# Patient Record
Sex: MALE | Race: WHITE | NOT HISPANIC OR LATINO | Employment: OTHER | URBAN - METROPOLITAN AREA
[De-identification: names, ages, dates, MRNs, and addresses within clinical notes are randomized per-mention and may not be internally consistent; named-entity substitution may affect disease eponyms.]

---

## 2022-05-30 ENCOUNTER — TELEPHONE (OUTPATIENT)
Dept: GASTROENTEROLOGY | Facility: CLINIC | Age: 62
End: 2022-05-30
Payer: COMMERCIAL

## 2022-05-30 NOTE — TELEPHONE ENCOUNTER
----- Message from Ernestina Berrios sent at 5/30/2022 10:39 AM CDT -----  Contact: [phone]  Type:  Sooner Appointment Request    Caller is requesting a sooner appointment.  Caller declined first available appointment listed below.  Caller will not accept being placed on the waitlist and is requesting a message be sent to doctor.    Name of Caller:  Pt  When is the first available appointment?  6/29  Symptoms:  Abdominal Cramping/ Cramping in colon        Additional Information:  [phone]

## 2022-05-30 NOTE — TELEPHONE ENCOUNTER
Call placed to pt and appt scheduled for next available, that worked for pt.  No further questions or concerns at this time.

## 2022-06-15 ENCOUNTER — OFFICE VISIT (OUTPATIENT)
Dept: GASTROENTEROLOGY | Facility: CLINIC | Age: 62
End: 2022-06-15
Payer: COMMERCIAL

## 2022-06-15 VITALS
HEIGHT: 72 IN | SYSTOLIC BLOOD PRESSURE: 125 MMHG | TEMPERATURE: 98 F | DIASTOLIC BLOOD PRESSURE: 73 MMHG | WEIGHT: 212.63 LBS | BODY MASS INDEX: 28.8 KG/M2 | OXYGEN SATURATION: 100 % | RESPIRATION RATE: 19 BRPM | HEART RATE: 86 BPM

## 2022-06-15 DIAGNOSIS — K21.9 GASTROESOPHAGEAL REFLUX DISEASE, UNSPECIFIED WHETHER ESOPHAGITIS PRESENT: ICD-10-CM

## 2022-06-15 DIAGNOSIS — K43.9 ABDOMINAL WALL HERNIA: ICD-10-CM

## 2022-06-15 DIAGNOSIS — K57.90 DIVERTICULOSIS: ICD-10-CM

## 2022-06-15 DIAGNOSIS — E65 OBESE ABDOMEN: ICD-10-CM

## 2022-06-15 DIAGNOSIS — Z87.19 HISTORY OF DIVERTICULITIS: Primary | ICD-10-CM

## 2022-06-15 DIAGNOSIS — K80.20 CALCULUS OF GALLBLADDER WITHOUT CHOLECYSTITIS WITHOUT OBSTRUCTION: ICD-10-CM

## 2022-06-15 PROCEDURE — 3074F PR MOST RECENT SYSTOLIC BLOOD PRESSURE < 130 MM HG: ICD-10-PCS | Mod: S$GLB,,, | Performed by: INTERNAL MEDICINE

## 2022-06-15 PROCEDURE — 99999 PR PBB SHADOW E&M-EST. PATIENT-LVL IV: CPT | Mod: PBBFAC,,, | Performed by: INTERNAL MEDICINE

## 2022-06-15 PROCEDURE — 3008F PR BODY MASS INDEX (BMI) DOCUMENTED: ICD-10-PCS | Mod: S$GLB,,, | Performed by: INTERNAL MEDICINE

## 2022-06-15 PROCEDURE — 3074F SYST BP LT 130 MM HG: CPT | Mod: S$GLB,,, | Performed by: INTERNAL MEDICINE

## 2022-06-15 PROCEDURE — 99204 OFFICE O/P NEW MOD 45 MIN: CPT | Mod: S$GLB,,, | Performed by: INTERNAL MEDICINE

## 2022-06-15 PROCEDURE — 1159F MED LIST DOCD IN RCRD: CPT | Mod: S$GLB,,, | Performed by: INTERNAL MEDICINE

## 2022-06-15 PROCEDURE — 1160F PR REVIEW ALL MEDS BY PRESCRIBER/CLIN PHARMACIST DOCUMENTED: ICD-10-PCS | Mod: S$GLB,,, | Performed by: INTERNAL MEDICINE

## 2022-06-15 PROCEDURE — 99204 PR OFFICE/OUTPT VISIT, NEW, LEVL IV, 45-59 MIN: ICD-10-PCS | Mod: S$GLB,,, | Performed by: INTERNAL MEDICINE

## 2022-06-15 PROCEDURE — 3078F DIAST BP <80 MM HG: CPT | Mod: S$GLB,,, | Performed by: INTERNAL MEDICINE

## 2022-06-15 PROCEDURE — 99999 PR PBB SHADOW E&M-EST. PATIENT-LVL IV: ICD-10-PCS | Mod: PBBFAC,,, | Performed by: INTERNAL MEDICINE

## 2022-06-15 PROCEDURE — 1160F RVW MEDS BY RX/DR IN RCRD: CPT | Mod: S$GLB,,, | Performed by: INTERNAL MEDICINE

## 2022-06-15 PROCEDURE — 3078F PR MOST RECENT DIASTOLIC BLOOD PRESSURE < 80 MM HG: ICD-10-PCS | Mod: S$GLB,,, | Performed by: INTERNAL MEDICINE

## 2022-06-15 PROCEDURE — 3008F BODY MASS INDEX DOCD: CPT | Mod: S$GLB,,, | Performed by: INTERNAL MEDICINE

## 2022-06-15 PROCEDURE — 1159F PR MEDICATION LIST DOCUMENTED IN MEDICAL RECORD: ICD-10-PCS | Mod: S$GLB,,, | Performed by: INTERNAL MEDICINE

## 2022-06-15 RX ORDER — KETOCONAZOLE 20 MG/G
AEROSOL, FOAM TOPICAL
COMMUNITY
Start: 2022-06-06

## 2022-06-15 RX ORDER — INSULIN GLARGINE 300 U/ML
INJECTION, SOLUTION SUBCUTANEOUS
COMMUNITY
Start: 2021-10-07

## 2022-06-15 RX ORDER — ZINC GLUCONATE 50 MG
TABLET ORAL
COMMUNITY
Start: 2022-01-04

## 2022-06-15 RX ORDER — FLUCONAZOLE 200 MG/1
200 TABLET ORAL
COMMUNITY
Start: 2022-06-14

## 2022-06-15 RX ORDER — PIOGLITAZONEHYDROCHLORIDE 15 MG/1
TABLET ORAL
COMMUNITY
Start: 2021-12-09

## 2022-06-15 RX ORDER — SILDENAFIL 100 MG/1
100 TABLET, FILM COATED ORAL
COMMUNITY
Start: 2022-06-08

## 2022-06-15 RX ORDER — ATORVASTATIN CALCIUM 10 MG/1
TABLET, FILM COATED ORAL
COMMUNITY
Start: 2022-03-07

## 2022-06-15 RX ORDER — DAPAGLIFLOZIN AND METFORMIN HYDROCHLORIDE 5; 1000 MG/1; MG/1
TABLET, FILM COATED, EXTENDED RELEASE ORAL
COMMUNITY
Start: 2021-12-17

## 2022-06-15 RX ORDER — SEMAGLUTIDE 1.34 MG/ML
INJECTION, SOLUTION SUBCUTANEOUS
COMMUNITY
Start: 2021-12-22

## 2022-06-15 RX ORDER — PIMECROLIMUS 10 MG/G
CREAM TOPICAL
COMMUNITY

## 2022-06-15 NOTE — PROGRESS NOTES
Subjective:       Patient ID: Frank Victoria is a 61 y.o. male.    Chief Complaint: Abdominal Cramping    He has a history of follicular lymphoma and is followed by the [facility] oncologist and is in remission.  He has had multiple CT scans.  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  He has a history of gastroesophageal reflux but is symptom-free.  He made a food diary and he is avoiding the offending foods particularly the fried and the fatty foods.  He denies abdominal pain today.  Approximately 3 weeks ago he developed left lower quadrant discomfort.  The CT scan showed diverticulitis.  He completed his antibiotics and today he is symptom-free.  In 2017 his colonoscopy revealed diverticulosis.  In 20 21 the Cologuard was negative.  He is having daily bowel movements.  He has a follow-up CT scan in 2 months.  He has incisional abdominal hernias.  He has gallstones but he denies abdominal pain.  And he states that he had is not having GI symptoms.  In the past he was reluctant to have exploratory laparotomy to evaluate the mesenteric mass.  He has undergone surgery for bowel obstruction in the past.  He is followed by his [facility] surgeon Dr Russell Hernández and the [facility] records were downloaded.      Allergies:  Review of patient's allergies indicates:  No Known Allergies    Medications:    Current Outpatient Medications:     ascorbic acid, vitamin C, (VITAMIN C) 100 MG tablet, Take 100 mg by mouth., Disp: , Rfl:     atorvastatin (LIPITOR) 10 MG tablet,  = 1 tab, Oral, Daily, # 90 tab, 0 Refill(s), Pharmacy: [id number], 182.8, cm, 02/01/22 8:16:00 CST, Height/Length Measured, 98.6, kg, 02/01/22 8:16:00 CST, Weight Dosing, Disp: , Rfl:     dapagliflozin-metformin (XIGDUO XR) 5-1,000 mg TBph, 1 tablet EVERY  (route: oral), Disp: , Rfl:     fluconazole (DIFLUCAN) 200 MG Tab, 200 mg., Disp: , Rfl:     insulin glargine U-300 conc (TOUJEO MAX U-300 SOLOSTAR) 300 unit/mL (3 mL) insulin pen, 90  unit DAILY  (route: subcutaneous), Disp: , Rfl:     KETODAN 2 % Foam, Apply topically., Disp: , Rfl:     pimecrolimus (ELIDEL) 1 % cream, , Disp: , Rfl:     pioglitazone (ACTOS) 15 MG tablet, 1 tablet DAILY  (route: oral), Disp: , Rfl:     semaglutide (OZEMPIC) 1 mg/dose (4 mg/3 mL), 1 mg 1MG SUBCUTANEOULSY EVERY WEEK  (route: subcutaneous), Disp: , Rfl:     sildenafiL (VIAGRA) 100 MG tablet, 100 mg., Disp: , Rfl:     zinc gluconate 50 mg tablet, 1 tablet DAILY (route: oral), Disp: , Rfl:     Past Medical History:   Diagnosis Date    Burst fracture of lumbar vertebra     Cancer     Diabetes     Hyperlipidemia     Kidney stones, calcium oxalate     Lymphorrhea        Past Surgical History:   Procedure Laterality Date    BACK SURGERY           Review of Systems   Constitutional: Negative for appetite change, fever and unexpected weight change.   HENT: Negative for trouble swallowing.         No jaundice.   Respiratory: Negative for cough, shortness of breath and wheezing.         He denies tobacco usage.  Currently he denies significant alcohol usage.  He denies dysphagia aspiration hemoptysis chronic cough chronic sputum production or dyspnea on exertion.   Cardiovascular: Negative for chest pain.        He denies exertional chest pain or rhythm disturbance.  He states that he has hypertension is well controlled.   Gastrointestinal: Negative for abdominal distention, abdominal pain, anal bleeding, blood in stool, constipation, diarrhea and nausea.   Endocrine:        He states his diabetes is well controlled the current medications.  He is trying to stay on the diabetic diet.  Only he has had more fiber to the diet.  He avoids the fried and the fatty foods.  He takes his vitamins and minerals.  He states his hyperlipidemia is well controlled.   Genitourinary:        He has a history kidney stones.  He has been evaluated by the urologist for lithotripsy.   Musculoskeletal: Positive for arthralgias.  Negative for back pain and neck pain.   Skin: Negative for pallor and rash.   Neurological: Negative for dizziness, seizures, syncope, speech difficulty, weakness and numbness.   Hematological: Negative for adenopathy.   Psychiatric/Behavioral: Negative for confusion.       Objective:      Physical Exam  Vitals reviewed.   Constitutional:       Appearance: He is well-developed.      Comments: Well-nourished well-hydrated overweight nonicteric white male.  He is sitting comfortably in the chair.  He is breathing normally.  He is normocephalic.  Pupils are normal.  He appears to be oriented x3 and can relate his history appropriately and answer questions appropriately.   HENT:      Head: Normocephalic.   Eyes:      Pupils: Pupils are equal, round, and reactive to light.   Neck:      Thyroid: No thyromegaly.      Trachea: No tracheal deviation.   Cardiovascular:      Rate and Rhythm: Normal rate and regular rhythm.      Heart sounds: Normal heart sounds.   Pulmonary:      Effort: Pulmonary effort is normal.      Breath sounds: Normal breath sounds.   Abdominal:      General: Bowel sounds are normal. There is no distension.      Palpations: Abdomen is soft. There is no mass.      Tenderness: There is no abdominal tenderness. There is no right CVA tenderness, left CVA tenderness, guarding or rebound.      Hernia: No hernia is present.      Comments: The abdomen is soft it is obese without tenderness masses organomegaly.  There surgical scars.  There is reducible incisional hernia.  Bowel sounds are normal.   Musculoskeletal:         General: Normal range of motion.      Cervical back: Normal range of motion and neck supple.      Comments: He can ambulate normally.  He can go from the sitting to the standing position without difficulty.  He can get on the exam table without difficulty or assistance.   Lymphadenopathy:      Cervical: No cervical adenopathy.   Skin:     General: Skin is warm and dry.   Neurological:       Mental Status: He is alert and oriented to person, place, and time.      Cranial Nerves: No cranial nerve deficit.   Psychiatric:         Behavior: Behavior normal.           Plan:       History of diverticulitis    Diverticulosis    Gastroesophageal reflux disease, unspecified whether esophagitis present    Calculus of gallbladder without cholecystitis without obstruction    Obese abdomen    Abdominal wall hernia    He will continue his reflux regimen.  He follows up with his oncologist.  He is symptom-free.  He has a scheduled CT scan in 2 months.  He has completed his antibiotics for diverticulitis.  He wants to hold off on further GI evaluation including colonoscopy.  He follows up with his urologist for the kidney stones.  He is followed by the [facility] surgeon for the abdominal wall hernia and gallstones.  The [facility] records will be requested.  He continues his diabetic diet current medications vitamins and minerals.  He continues his bowel program to produced daily bowel movements.

## 2022-06-15 NOTE — PATIENT INSTRUCTIONS
He will continue his current medications.  He has a follow-up CT scan in 2 months.  He completed his antibiotics for diverticulitis.  He will follow up with the urologist for the kidney stones.  He follows up with his [facility].  He continues his current medications and nutritious diabetic diet

## 2022-06-16 PROBLEM — K57.90 DIVERTICULOSIS: Status: ACTIVE | Noted: 2022-06-16

## 2022-06-16 PROBLEM — E65 OBESE ABDOMEN: Status: ACTIVE | Noted: 2022-06-16

## 2022-06-16 PROBLEM — K43.9 ABDOMINAL WALL HERNIA: Status: ACTIVE | Noted: 2022-06-16

## 2022-06-16 PROBLEM — K80.20 CALCULUS OF GALLBLADDER WITHOUT CHOLECYSTITIS WITHOUT OBSTRUCTION: Status: ACTIVE | Noted: 2022-06-16

## 2022-06-16 PROBLEM — Z87.19 HISTORY OF DIVERTICULITIS: Status: ACTIVE | Noted: 2022-06-16

## 2022-06-16 PROBLEM — K21.9 GASTROESOPHAGEAL REFLUX DISEASE: Status: ACTIVE | Noted: 2022-06-16

## 2022-09-14 ENCOUNTER — OFFICE VISIT (OUTPATIENT)
Dept: GASTROENTEROLOGY | Facility: CLINIC | Age: 62
End: 2022-09-14
Payer: COMMERCIAL

## 2022-09-14 VITALS
HEART RATE: 54 BPM | HEIGHT: 72 IN | SYSTOLIC BLOOD PRESSURE: 115 MMHG | BODY MASS INDEX: 28.99 KG/M2 | OXYGEN SATURATION: 98 % | RESPIRATION RATE: 17 BRPM | WEIGHT: 214.06 LBS | DIASTOLIC BLOOD PRESSURE: 64 MMHG

## 2022-09-14 DIAGNOSIS — K43.9 ABDOMINAL WALL HERNIA: ICD-10-CM

## 2022-09-14 DIAGNOSIS — E65 OBESE ABDOMEN: Primary | ICD-10-CM

## 2022-09-14 DIAGNOSIS — C85.99 LYMPHOMA OF SMALL BOWEL: ICD-10-CM

## 2022-09-14 DIAGNOSIS — K57.90 DIVERTICULOSIS: ICD-10-CM

## 2022-09-14 DIAGNOSIS — K80.20 CALCULUS OF GALLBLADDER WITHOUT CHOLECYSTITIS WITHOUT OBSTRUCTION: ICD-10-CM

## 2022-09-14 DIAGNOSIS — Z87.19 HISTORY OF DIVERTICULITIS: ICD-10-CM

## 2022-09-14 DIAGNOSIS — K21.9 GASTROESOPHAGEAL REFLUX DISEASE, UNSPECIFIED WHETHER ESOPHAGITIS PRESENT: ICD-10-CM

## 2022-09-14 PROCEDURE — 1159F PR MEDICATION LIST DOCUMENTED IN MEDICAL RECORD: ICD-10-PCS | Mod: S$GLB,,, | Performed by: INTERNAL MEDICINE

## 2022-09-14 PROCEDURE — 3074F SYST BP LT 130 MM HG: CPT | Mod: S$GLB,,, | Performed by: INTERNAL MEDICINE

## 2022-09-14 PROCEDURE — 3078F PR MOST RECENT DIASTOLIC BLOOD PRESSURE < 80 MM HG: ICD-10-PCS | Mod: S$GLB,,, | Performed by: INTERNAL MEDICINE

## 2022-09-14 PROCEDURE — 99999 PR PBB SHADOW E&M-EST. PATIENT-LVL IV: CPT | Mod: PBBFAC,,, | Performed by: INTERNAL MEDICINE

## 2022-09-14 PROCEDURE — 3074F PR MOST RECENT SYSTOLIC BLOOD PRESSURE < 130 MM HG: ICD-10-PCS | Mod: S$GLB,,, | Performed by: INTERNAL MEDICINE

## 2022-09-14 PROCEDURE — 3078F DIAST BP <80 MM HG: CPT | Mod: S$GLB,,, | Performed by: INTERNAL MEDICINE

## 2022-09-14 PROCEDURE — 3008F BODY MASS INDEX DOCD: CPT | Mod: S$GLB,,, | Performed by: INTERNAL MEDICINE

## 2022-09-14 PROCEDURE — 1159F MED LIST DOCD IN RCRD: CPT | Mod: S$GLB,,, | Performed by: INTERNAL MEDICINE

## 2022-09-14 PROCEDURE — 99999 PR PBB SHADOW E&M-EST. PATIENT-LVL IV: ICD-10-PCS | Mod: PBBFAC,,, | Performed by: INTERNAL MEDICINE

## 2022-09-14 PROCEDURE — 99214 OFFICE O/P EST MOD 30 MIN: CPT | Mod: S$GLB,,, | Performed by: INTERNAL MEDICINE

## 2022-09-14 PROCEDURE — 99214 PR OFFICE/OUTPT VISIT, EST, LEVL IV, 30-39 MIN: ICD-10-PCS | Mod: S$GLB,,, | Performed by: INTERNAL MEDICINE

## 2022-09-14 PROCEDURE — 3008F PR BODY MASS INDEX (BMI) DOCUMENTED: ICD-10-PCS | Mod: S$GLB,,, | Performed by: INTERNAL MEDICINE

## 2022-09-14 RX ORDER — PEN NEEDLE, DIABETIC 32GX 5/32"
NEEDLE, DISPOSABLE MISCELLANEOUS
COMMUNITY
Start: 2022-07-06

## 2022-09-14 RX ORDER — SEMAGLUTIDE 2.68 MG/ML
INJECTION, SOLUTION SUBCUTANEOUS
COMMUNITY
Start: 2022-08-03

## 2022-09-14 RX ORDER — HYDROCODONE BITARTRATE AND ACETAMINOPHEN 5; 325 MG/1; MG/1
1-2 TABLET ORAL EVERY 6 HOURS PRN
COMMUNITY
Start: 2022-08-19

## 2022-09-14 NOTE — PROGRESS NOTES
Subjective:       Patient ID: Frank Victoria is a 62 y.o. male.    Chief Complaint: Follow-up       Office Visit  6/15/2022  [place] [facility]  Leonid Chavez MD  Gastroenterology History of diverticulitis +5 more  Dx Abdominal Cramping; Referred by Aaareferral Self  Reason for Visit    Progress Notes  Leonid Chavez MD (Physician) · · Gastroenterology  Subjective:      Patient ID: Frank Victoria is a 61 y.o. male.     Chief Complaint: Abdominal Cramping     He has a history of follicular lymphoma and is followed by the [facility] oncologist and is in remission.  He has had multiple CT scans.  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  He has a history of gastroesophageal reflux but is symptom-free.  He made a food diary and he is avoiding the offending foods particularly the fried and the fatty foods.  He denies abdominal pain today.  Approximately 3 weeks ago he developed left lower quadrant discomfort.  The CT scan showed diverticulitis.  He completed his antibiotics and today he is symptom-free.  In 2017 his colonoscopy revealed diverticulosis.  In 20 21 the Cologuard was negative.  He is having daily bowel movements.  He has a follow-up CT scan in 2 months.  He has incisional abdominal hernias.  He has gallstones but he denies abdominal pain.  And he states that he had is not having GI symptoms.  In the past he was reluctant to have exploratory laparotomy to evaluate the mesenteric mass.  He has undergone surgery for bowel obstruction in the past.  He is followed by his [facility] surgeon Dr Russell Hernández and the [facility] records were downloaded.        Allergies:  Review of patient's allergies indicates:  No Known Allergies     Medications:     Current Outpatient Medications:   ·  ascorbic acid, vitamin C, (VITAMIN C) 100 MG tablet, Take 100 mg by mouth., Disp: , Rfl:   ·  atorvastatin (LIPITOR) 10 MG tablet,  = 1 tab, Oral, Daily, # 90 tab, 0 Refill(s), Pharmacy: [facility] #[id number], 309.2,  cm, 02/01/22 8:16:00 CST, Height/Length Measured, 98.6, kg, 02/01/22 8:16:00 CST, Weight Dosing, Disp: , Rfl:   ·  dapagliflozin-metformin (XIGDUO XR) 5-1,000 mg TBph, 1 tablet EVERY  (route: oral), Disp: , Rfl:   ·  fluconazole (DIFLUCAN) 200 MG Tab, 200 mg., Disp: , Rfl:   ·  insulin glargine U-300 conc (TOUJEO MAX U-300 SOLOSTAR) 300 unit/mL (3 mL) insulin pen, 90 unit DAILY  (route: subcutaneous), Disp: , Rfl:   ·  KETODAN 2 % Foam, Apply topically., Disp: , Rfl:   ·  pimecrolimus (ELIDEL) 1 % cream, , Disp: , Rfl:   ·  pioglitazone (ACTOS) 15 MG tablet, 1 tablet DAILY  (route: oral), Disp: , Rfl:   ·  semaglutide (OZEMPIC) 1 mg/dose (4 mg/3 mL), 1 mg 1MG SUBCUTANEOULSY EVERY WEEK  (route: subcutaneous), Disp: , Rfl:   ·  sildenafiL (VIAGRA) 100 MG tablet, 100 mg., Disp: , Rfl:   ·  zinc gluconate 50 mg tablet, 1 tablet DAILY (route: oral), Disp: , Rfl:        Past Medical History:  Diagnosis Date  · Burst fracture of lumbar vertebra    · Cancer    · Diabetes    · Hyperlipidemia    · Kidney stones, calcium oxalate    · Lymphorrhea            Past Surgical History:  Procedure Laterality Date  · BACK SURGERY               Review of Systems   Constitutional: Negative for appetite change, fever and unexpected weight change.   HENT: Negative for trouble swallowing.         No jaundice.   Respiratory: Negative for cough, shortness of breath and wheezing.         He denies tobacco usage.  Currently he denies significant alcohol usage.  He denies dysphagia aspiration hemoptysis chronic cough chronic sputum production or dyspnea on exertion.   Cardiovascular: Negative for chest pain.        He denies exertional chest pain or rhythm disturbance.  He states that he has hypertension is well controlled.   Gastrointestinal: Negative for abdominal distention, abdominal pain, anal bleeding, blood in stool, constipation, diarrhea and nausea.   Endocrine:        He states his diabetes is well controlled the current medications.   He is trying to stay on the diabetic diet.  Only he has had more fiber to the diet.  He avoids the fried and the fatty foods.  He takes his vitamins and minerals.  He states his hyperlipidemia is well controlled.   Genitourinary:        He has a history kidney stones.  He has been evaluated by the urologist for lithotripsy.   Musculoskeletal: Positive for arthralgias. Negative for back pain and neck pain.   Skin: Negative for pallor and rash.   Neurological: Negative for dizziness, seizures, syncope, speech difficulty, weakness and numbness.   Hematological: Negative for adenopathy.   Psychiatric/Behavioral: Negative for confusion.         Objective:  Physical Exam  Vitals reviewed.   Constitutional:       Appearance: He is well-developed.      Comments: Well-nourished well-hydrated overweight nonicteric white male.  He is sitting comfortably in the chair.  He is breathing normally.  He is normocephalic.  Pupils are normal.  He appears to be oriented x3 and can relate his history appropriately and answer questions appropriately.   HENT:      Head: Normocephalic.   Eyes:      Pupils: Pupils are equal, round, and reactive to light.   Neck:      Thyroid: No thyromegaly.      Trachea: No tracheal deviation.   Cardiovascular:      Rate and Rhythm: Normal rate and regular rhythm.      Heart sounds: Normal heart sounds.   Pulmonary:      Effort: Pulmonary effort is normal.      Breath sounds: Normal breath sounds.   Abdominal:      General: Bowel sounds are normal. There is no distension.      Palpations: Abdomen is soft. There is no mass.      Tenderness: There is no abdominal tenderness. There is no right CVA tenderness, left CVA tenderness, guarding or rebound.      Hernia: No hernia is present.      Comments: The abdomen is soft it is obese without tenderness masses organomegaly.  There surgical scars.  There is reducible incisional hernia.  Bowel sounds are normal.   Musculoskeletal:         General: Normal range of  motion.      Cervical back: Normal range of motion and neck supple.      Comments: He can ambulate normally.  He can go from the sitting to the standing position without difficulty.  He can get on the exam table without difficulty or assistance.   Lymphadenopathy:      Cervical: No cervical adenopathy.   Skin:     General: Skin is warm and dry.   Neurological:      Mental Status: He is alert and oriented to person, place, and time.      Cranial Nerves: No cranial nerve deficit.   Psychiatric:         Behavior: Behavior normal.               Plan:      History of diverticulitis     Diverticulosis     Gastroesophageal reflux disease, unspecified whether esophagitis present     Calculus of gallbladder without cholecystitis without obstruction     Obese abdomen     Abdominal wall hernia     He will continue his reflux regimen.  He follows up with his oncologist.  He is symptom-free.  He has a scheduled CT scan in 2 months.  He has completed his antibiotics for diverticulitis.  He wants to hold off on further GI evaluation including colonoscopy.  He follows up with his urologist for the kidney stones.  He is followed by the [facility] surgeon for the abdominal wall hernia and gallstones.  The [facility] records will be requested.  He continues his diabetic diet current medications vitamins and minerals.  He continues his bowel program to produced daily bowel movements.             Other Notes    All notes  Instructions      Follow up in about 3 months (around 9/15/2022).  He will continue his current medications.  He has a follow-up CT scan in 2 months.  He completed his antibiotics for diverticulitis.  He will follow up with the urologist for the kidney stones.  He follows up with his [facility] physicians.  He continues his current medications and nutritious diabetic diet         After Visit Summary (Automatic SnapShot taken 6/16/2022)  Additional Documentation    Vitals:  /73 (BP Location: Left arm, Patient Position:  Sitting, BP Method: Medium (Automatic))  Pulse 86  Temp 97.9 °F (36.6 °C) (Oral)  Resp 19  Ht 6' (1.829 m)  Wt 96.4 kg (212 lb 9.6 oz)  SpO2 100%  BMI 28.83 kg/m²  BSA 2.21 m²  Pain Sc 0-No pain  Flowsheets:  Code Vitals,  Vital Signs,  Anthropometrics    Encounter Info:  Billing Info,  History,  Allergies,  Detailed Report      Communications    View Encounter Conversation Summary  Not recorded  All Charges for This Encounter    Code Description Service Date Service Provider Modifiers Qty  82890 [place] OFFICE/OUTPT VISIT, NEW, LEVL IV, 45-59 MIN 6/15/2022 Leonid Chavez MD S$GLB 1  [id number] [place] PBB SHADOW E&M-EST. PATIENT-LVL IV 6/15/2022 Leonid Chavez MD PBBFAC 1  3078F [place] MOST RECENT DIASTOLIC BLOOD PRESSURE < 80 MM HG 6/15/2022 Leonid Chavez MD S$GLB 1  3074F IL MOST RECENT SYSTOLIC BLOOD PRESSURE < 130 MM HG 6/15/2022 Leonid Chavez MD S$GLB 1  3008F [place] BODY MASS INDEX (BMI) DOCUMENTED 6/15/2022 Leonid Chavez MD S$GLB 1  1159F [place] MEDICATION LIST DOCUMENTED IN MEDICAL RECORD 6/15/2022 Leonid Chavez MD S$GLB 1  1160F [place] REVIEW ALL MEDS BY PRESCRIBER/CLIN PHARMACIST DOCUMENTED 6/15/2022 Leonid Chavez MD S$GLB 1    Level of Service    Level of Service  IL OFFICE/OUTPT VISIT, NEW, LEVL IV, 45-59 MIN [07090]    BestPractice Advisories    Click to view BestPractice Advisory history    AVS Reports    Date/Time Report Action User  6/16/2022  7:10 AM After Visit Summary Automatically Generated Leonid Chavez MD    Encounter-Level Documents on 06/15/2022:    After Visit Summary - Document on 6/16/2022 7:10 AM by Leonid Chavez MD: After Visit Summary  Orders Placed    None  Medication Changes      None    Medication List    Visit Diagnoses      History of diverticulitis    Diverticulosis    Gastroesophageal reflux disease, unspecified whether esophagitis present    Calculus of gallbladder without cholecystitis without obstruction    Obese abdomen    Abdominal wal    Office visit 09/14/2022 he denies abdominal pain.  He  "has history of gastroesophageal reflux but he denies significant pyrosis or dyspepsia.  He has a history of presumed diverticulitis.  His abdominal pain is resolved.  He does not want further GI evaluation.  He has gallstones but he denies abdominal pain.  He has been followed by the oncologist and the mesenteric mass is unchanged.  The [facility] records were downloaded.  He is concerned about the gallstones on whether he should have surgery.  His last visit was to [facility] in 2008.  He wants to return there for evaluation.  He will be scheduled for referral.  He can discuss further therapy of the mesenteric mass.  The [facility] radiologist could not perform a biopsy.  It appears to be clinically stable are on the CT scan.  He has gallstones but is symptom-free and he denies jaundice but he also avoids the offending foods such as the fried and fatty foods.  S    Allergies:  Review of patient's allergies indicates:  No Known Allergies    Medications:    Current Outpatient Medications:     atorvastatin (LIPITOR) 10 MG tablet,  = 1 tab, Oral, Daily, # 90 tab, 0 Refill(s), [id number], 182.8, cm, 02/01/22 8:16:00 CST, Height/Length Measured, 98.6, kg, 02/01/22 8:16:00 CST, Weight Dosing, Disp: , Rfl:     BD MARLEEN 2ND GEN PEN NEEDLE 32 gauge x 5/32" Ndle, use as directed, Disp: , Rfl:     dapagliflozin-metformin (XIGDUO XR) 5-1,000 mg TBph, 1 tablet EVERY  (route: oral), Disp: , Rfl:     fluconazole (DIFLUCAN) 200 MG Tab, 200 mg., Disp: , Rfl:     HYDROcodone-acetaminophen (NORCO) 5-325 mg per tablet, Take 1-2 tablets by mouth every 6 (six) hours as needed., Disp: , Rfl:     insulin glargine U-300 conc (TOUJEO MAX U-300 SOLOSTAR) 300 unit/mL (3 mL) insulin pen, 90 unit DAILY  (route: subcutaneous), Disp: , Rfl:     KETODAN 2 % Foam, Apply topically., Disp: , Rfl:     OZEMPIC 2 mg/dose (8 mg/3 mL) PnIj, Inject into the skin., Disp: , Rfl:     pimecrolimus (ELIDEL) 1 % cream, , Disp: , Rfl:     " pioglitazone (ACTOS) 15 MG tablet, 1 tablet DAILY  (route: oral), Disp: , Rfl:     semaglutide (OZEMPIC) 1 mg/dose (4 mg/3 mL), 1 mg 1MG SUBCUTANEOULSY EVERY WEEK  (route: subcutaneous), Disp: , Rfl:     sildenafiL (VIAGRA) 100 MG tablet, 100 mg., Disp: , Rfl:     zinc gluconate 50 mg tablet, 1 tablet DAILY (route: oral), Disp: , Rfl:     Past Medical History:   Diagnosis Date    Burst fracture of lumbar vertebra     Cancer     Diabetes     Hyperlipidemia     Kidney stones, calcium oxalate     Lymphorrhea        Past Surgical History:   Procedure Laterality Date    BACK SURGERY           Review of Systems   Constitutional:  Negative for appetite change, fever and unexpected weight change.   HENT:  Negative for trouble swallowing.         No jaundice.   Respiratory:  Negative for cough, shortness of breath and wheezing.         He is a former smoker.  He denies further tobacco usage.  He denies significant alcohol usage.  He denies dysphagia aspiration hemoptysis chronic cough chronic sputum production or dyspnea   Cardiovascular:  Negative for chest pain.        He denies exertional chest pain or rhythm disturbance.  He states his hyperlipidemia and hypertension are well controlled on the current medications.   Gastrointestinal:  Negative for abdominal distention, abdominal pain, anal bleeding, blood in stool, constipation, diarrhea, nausea, rectal pain and vomiting.        He is having daily bowel movements with the vegetables and fiber supplements.  Has a history gastroesophageal reflux and diverticulosis.  He does not want further upper endoscopy and colonoscopy at this time   Endocrine:        He is on the diabetic diet.  He states that he takes his medications diabetes is well controlled.  He is followed by the endocrinologist.  He has tried to avoid the offending foods particularly the fried and the fatty foods.  He has tried to limit his carbohydrates.  He takes his vitamins and minerals.   Genitourinary:          He is followed by the [facility] urologist for the kidney stones.  He denies  symptoms.    Musculoskeletal:  Positive for arthralgias. Negative for back pain and neck pain.   Skin:  Negative for pallor and rash.   Neurological:  Negative for dizziness, seizures, syncope, speech difficulty, weakness and numbness.   Hematological:  Negative for adenopathy.   Psychiatric/Behavioral:  Negative for confusion.      Objective:      Physical Exam  Vitals reviewed.   Constitutional:       Appearance: He is well-developed.      Comments: Well-nourished well-hydrated afebrile overweight white male.  He is sitting comfortably in the chair.  He is breathing normally.  Is normocephalic.  Pupils are normal.  He appears to be oriented x3 and can relate his history and answer questions appropriately.   HENT:      Head: Normocephalic.   Eyes:      Pupils: Pupils are equal, round, and reactive to light.   Neck:      Thyroid: No thyromegaly.      Trachea: No tracheal deviation.   Cardiovascular:      Rate and Rhythm: Normal rate and regular rhythm.      Heart sounds: Normal heart sounds.   Pulmonary:      Effort: Pulmonary effort is normal.      Breath sounds: Normal breath sounds.   Abdominal:      General: Bowel sounds are normal. There is no distension.      Palpations: Abdomen is soft. There is no mass.      Tenderness: There is no abdominal tenderness. There is no guarding or rebound.      Hernia: A hernia is present.      Comments: There is a small reducible incisional hernia.  The abdomen is soft it is obese without tenderness masses organomegaly.  Bowel sounds are normal.   Musculoskeletal:         General: Normal range of motion.      Cervical back: Normal range of motion and neck supple.      Comments: He can ambulate normally.  He can go from the sitting the standing position without difficulty.  He can get on the exam table without difficulty or assistance.   Lymphadenopathy:      Cervical: No cervical adenopathy.    Skin:     General: Skin is warm and dry.   Neurological:      Mental Status: He is alert and oriented to person, place, and time.      Cranial Nerves: No cranial nerve deficit.   Psychiatric:         Behavior: Behavior normal.         Plan:       Obese abdomen    Abdominal wall hernia    Gastroesophageal reflux disease, unspecified whether esophagitis present    Diverticulosis    History of diverticulitis    Calculus of gallbladder without cholecystitis without obstruction    Lymphoma of small bowel        He will continue his reflux regimen.  He continues the diabetic diet.  I have encouraged weight reduction by decreasing caloric intake.  He will avoid the offending foods especially the fried and fatty foods.  He will add more fiber and vegetables to the diet.  He will continue his follow-up with his [facility] physicians and the records were requested.  He is referred to the [facility] for evaluation.

## 2022-09-14 NOTE — PATIENT INSTRUCTIONS
He will follow with his [facility] physicians.  He continues his current medications and diabetic diet.  He is referred to the [facility].  He has a history of small bowel lymphoma.

## 2022-10-31 ENCOUNTER — PATIENT MESSAGE (OUTPATIENT)
Dept: GASTROENTEROLOGY | Facility: CLINIC | Age: 62
End: 2022-10-31
Payer: COMMERCIAL